# Patient Record
(demographics unavailable — no encounter records)

---
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
## 2019-01-17 NOTE — EKG
Test Reason : 

Blood Pressure : ***/*** mmHG

Vent. Rate : 068 BPM     Atrial Rate : 068 BPM

   P-R Int : 110 ms          QRS Dur : 092 ms

    QT Int : 368 ms       P-R-T Axes : 047 -21 029 degrees

   QTc Int : 391 ms

 

SINUS RHYTHM WITH SINUS ARRHYTHMIA WITH SHORT [place]

INCOMPLETE RIGHT BUNDLE BRANCH BLOCK

BORDERLINE ECG

NO PREVIOUS ECGS AVAILABLE

Confirmed by JOSE HERMOSILLO MD ([id number]) on 1/17/2019 2:35:47 PM

 

Referred By:             Confirmed By:JOSE HERMOSILLO MD

## 2019-01-17 NOTE — PDOC
History of Present Illness





- General


Chief Complaint: Seizure


Stated Complaint: SEIZURE


Time Seen by Provider: 01/17/19 08:24


History Source: Care Provider, EMS, Nursing Home Records


Exam Limitations: Clinical Condition





- History of Present Illness


Initial Comments: 





01/17/19 08:46


The patient is a 36M with a PMH of congenital diplegia, CP, MR who presents to 

the ER after having a seizure at his NH. Per EMS, he has a history of seizures 

and had a seizure around 0615 where his eyes rolled in the back of his head 

with facial twitching and eye twitching and drooling. He has not had seizures 

in over 2 years. He is nonverbal and cannot provide any history.





Past History





- Past Medical History


Allergies/Adverse Reactions: 


 Allergies











Allergy/AdvReac Type Severity Reaction Status Date / Time


 


No Known Allergies Allergy   Verified 01/17/19 08:37











Home Medications: 


Ambulatory Orders





Acetaminophen [Tylenol] 650 mg PO Q4H PRN 01/17/19 


Albuterol Sulfate Inhaler - [Ventolin Hfa Inhaler -] 1 - 2 inh PO Q4H 01/17/19 


Cetirizine HCl [Zyrtec -] 10 mg PO HS 01/17/19 


Clonazepam 0.5 mg PO TID 01/17/19 


Docusate Sodium [Colace] 100 mg PO BID 01/17/19 


Emollient Combination No.92 [Lubriderm Daily Moisture] 177 ml TP DAILY 01/17/19 


Lorazepam [Ativan] 1 mg PO DAILY 01/17/19 


Multivitamin [Poly-Vitamin] 1 each PO DAILY 01/17/19 


Olanzapine [Zyprexa] 7.5 mg PO DAILY 01/17/19 


Simethicone [Gas Relief] 40 mg PO TID 01/17/19 


Sodium Phosphate/Na Biphos [Fleet Adult Rectal Enema -] 133 ml RC ASDIR 01/17/ 19 


Valproic Acid [Depakene] 500 mg PO DAILY 01/17/19 








COPD: No


CHF: No


DVT: No (profound mental retardation,)


Psychiatric Problems: Yes (agiation, aggresive behavior.)


Seizures: Yes (epilepsy.)


Other medical history: left hip dislocated hip, cauliflower ear,bilateral 

blindness





- Immunization History


Td Vaccination: Yes


TDAP Vaccination: Yes


Immunization Up to Date: Yes





- Suicide/Smoking/Psychosocial Hx


Smoking Status: No (profound MR)


Smoking History: Never smoked


Years of Tobacco Use: 0


Have you smoked in the past 12 months: No


Number of Cigarettes Smoked Daily: 0


Cigars Per Day: 0


Information on smoking cessation initiated: No


Hx Alcohol Use: No


Drug/Substance Use Hx: No





**Review of Systems





- Review of Systems


Able to Perform ROS?: No (nonverbal)





*Physical Exam





- Vital Signs


 Last Vital Signs











Temp Pulse Resp BP Pulse Ox


 


 96.3 F L  67   16   137/97   100 


 


 01/17/19 08:19  01/17/19 08:19  01/17/19 08:19  01/17/19 08:19  01/17/19 08:19














- Physical Exam


Comments: 





01/17/19 09:00


GENERAL: Contracted, but well developed, well nourished.


HEENT: Normocephalic, atraumatic. Hearing grossly normal. Moist mucous 

membranes. 


NECK: Supple. No JVD. 


CARDIOVASCULAR: Regular rate and rhythm. No murmurs, rubs, or gallops. 


PULMONARY: No evidence of respiratory distress. Coarse breath sounds 

bilaterally.


ABDOMINAL: Soft. Non-tender. Non-distended. No rebound or guarding. 


MUSCULOSKELETAL: Diffusely contracted.


EXTREMITIES: No cyanosis. No clubbing. No edema. No calf tenderness or swelling.


SKIN: Warm and dry. Normal capillary refill. No rashes. No jaundice. 


PSYCHIATRIC: Cooperative. Good eye contact. Appropriate mood and affect.








Moderate Sedation





- Procedure Monitoring


Vital Signs: 


Procedure Monitoring Vital Signs











Temperature  96.3 F L  01/17/19 08:19


 


Pulse Rate  67   01/17/19 08:19


 


Respiratory Rate  16   01/17/19 08:19


 


Blood Pressure  137/97   01/17/19 08:19


 


O2 Sat by Pulse Oximetry (%)  100   01/17/19 08:19











ED Treatment Course





- LABORATORY


CBC & Chemistry Diagram: 


 01/17/19 08:58





 01/17/19 08:58





- RADIOLOGY


Radiology Studies Ordered: 














 Category Date Time Status


 


 CHEST X-RAY PORTABLE* [RAD] Stat Radiology  01/17/19 08:31 Ordered














Medical Decision Making





- Medical Decision Making





01/17/19 09:02


The patient is a 36M with MMP presenting from [facility] for possible seizure 

activity. Rectal temp of 96, concerning for sepsis. Will order septic workup 

and valproic acid level. Provider # [id number]. 





01/17/19 10:56


Lactic of 3.1. Will give fluid bolus and recheck as UA is negative and CXR is 

negative. Valproic acid given.





01/17/19 13:50


Repeat lactate 1.4. Will d/c with PCP fu.





*DC/Admit/Observation/Transfer


Diagnosis at time of Disposition: 


 Seizure








- Discharge Dispostion


Disposition: HOME


Condition at time of disposition: Stable


Decision to Admit order: No





- Referrals





- Patient Instructions


Printed Discharge Instructions:  DI for Seizure Disorder -- Adult


Additional Instructions: 


Please follow up with your primary care physician in 2-3 days. 





Please return to the ER if you have any signs or symptoms of chest pain, 

shortness of breath, uncontrollable fever, chills, nausea, vomiting, numbness, 

tingling, or weakness in any part of your body, changes in vision, or slurred 

speech.





Please return to the ER if symptoms persist, worsen, or new symptoms arise.








- Post Discharge Activity

## 2019-02-16 NOTE — CONSULT
Consult - text type





- Consultation


Consultation Note: 


            Neurology





History of Present Illness:


Patient is a 36M from [facility] with history of congenital diplegia, CP, MR

, seizures and presented ER with seizure today here today, day of admission. 

EMS reported that the patient had a seizure prior to their arrival, then they 

witnessed a seizure in the field. Both seizures lasted for about a minute. BGM 

fingerstick was 126. Patient was given 5 versed IV in the field, resolved 

symptoms. Patient takes benzodiazepines and valproate for seizures. No other 

history available. Seen at bedside in ER and no seizure like activity. Spoke to 

representative from [place] and reports last seizure approximately 1-2 mons 

ago. Discused with PCP and would benefit from increased dose of Depakene. 





Past History





- Past Medical History


Allergies/Adverse Reactions: 


 Allergies











Allergy/AdvReac Type Severity Reaction Status Date / Time


 


No Known Allergies Allergy   Verified 02/16/19 06:41











Home Medications: 


Ambulatory Orders





Cetirizine HCl [Zyrtec -] 10 mg PO HS 01/17/19 


Clonazepam 0.5 mg PO TID 01/17/19 


Emollient Combination No.92 [Lubriderm Daily Moisture] 177 ml TP DAILY 01/17/19 


Lorazepam [Ativan] 1 mg PO DAILY 01/17/19 


Multivitamin [Poly-Vitamin] 1 each PO DAILY 01/17/19 


Olanzapine [Zyprexa] 7.5 mg PO DAILY 01/17/19 


Simethicone [Gas Relief] 40 mg PO TID 01/17/19 


Valproic Acid [Depakene] 500 mg PO DAILY 01/17/19 








COPD: No


CHF: No


DVT: No (profound mental retardation,)


Psychiatric Problems: Yes (agiation, aggresive behavior.)


Seizures: Yes (epilepsy.)





- Immunization History


Td Vaccination: Yes


TDAP Vaccination: Yes


Immunization Up to Date: Yes





- Suicide/Smoking/Psychosocial Hx


Smoking Status: No (profound MR)


Smoking History: Never smoked


Years of Tobacco Use: 0


Have you smoked in the past 12 months: No


Number of Cigarettes Smoked Daily: 0


Cigars Per Day: 0


Hx Alcohol Use: No


Drug/Substance Use Hx: No





**Review of Systems





- Review of Systems


Able to Perform ROS?: No (2/2 clinical condition)





*Physical Exam





- Vital Signs


 Vital Signs











Temperature  96.7 F L  02/16/19 06:35


 


Pulse Rate  105 H  02/16/19 08:30


 


Respiratory Rate  24 H  02/16/19 08:30


 


Blood Pressure  119/84   02/16/19 08:30


 


O2 Sat by Pulse Oximetry (%)  98   02/16/19 08:30














- Physical Exam


GENERAL: Sonorous, no response to verbal stimuli


HEAD: No signs of trauma


EYES: No appreciable pupils or eye movement, sclera anicteric, conjunctiva clear


ENT: Auricles normal inspection, hearing grossly normal, nares patent, Moist 

mucosa


NECK: Contracted neck, no JVD


LUNGS: No distress, clear to auscultation bilaterally


HEART: Tachycardic, regular, normal S1 and S2, no murmurs, rubs or gallops, 

peripheral pulses normal and equal bilaterally.


ABDOMEN: Soft, nontender, normoactive bowel sounds.  No guarding, no rebound.  

No masses


EXTREMITIES: Contractures in all extremities


NEUROLOGICAL: Contracted, moving all extremities


SKIN: Warm, Dry, normal turgor, no rashes or lesions noted.





 


 CBCD











WBC  14.7 K/mm3 (4.0-10.0)  H  02/16/19  06:25    


 


RBC  5.39 M/mm3 (4.00-5.60)   02/16/19  06:25    


 


Hgb  15.8 GM/dL (11.7-16.9)   02/16/19  06:25    


 


Hct  46.5 % (35.4-49)   02/16/19  06:25    


 


MCV  86.1 fl (80-96)   02/16/19  06:25    


 


MCHC  34.1 g/dl (32.0-35.9)   02/16/19  06:25    


 


RDW  14.0 % (11.9-15.9)   02/16/19  06:25    


 


Plt Count  199 K/MM3 (134-434)   02/16/19  06:25    


 


MPV  9.1 fl (7.5-11.1)   02/16/19  06:25    








 CMP











Sodium  137 mmol/L (136-145)   02/16/19  06:25    


 


Potassium  3.5 mmol/L (3.5-5.1)   02/16/19  06:25    


 


Chloride  100 mmol/L ()   02/16/19  06:25    


 


Carbon Dioxide  27 mmol/L (21-32)   02/16/19  06:25    


 


Anion Gap  10 MMOL/L (8-16)   02/16/19  06:25    


 


BUN  13 mg/dL (7-18)   02/16/19  06:25    


 


Creatinine  1.0 mg/dL (0.55-1.3)   02/16/19  06:25    


 


Creat Clearance w eGFR  > 60  (>60)   02/16/19  06:25    


 


Random Glucose  149 mg/dL ()  H  02/16/19  06:25    


 


Calcium  9.0 mg/dL (8.5-10.1)   02/16/19  06:25    


 


Total Bilirubin  0.4 mg/dL (0.2-1)   02/16/19  06:25    


 


AST  43 U/L (15-37)  H  02/16/19  06:25    


 


ALT  59 U/L (13-61)   02/16/19  06:25    


 


Alkaline Phosphatase  93 U/L ()   02/16/19  06:25    


 


Total Protein  8.0 g/dl (6.4-8.2)   02/16/19  06:25    


 


Albumin  4.2 g/dl (3.4-5.0)   02/16/19  06:25    











Diagnostics:


Head CT - completed, reviewed





Plan/Assessment:


36M from [facility] with history of congenital diplegia, CP, MR, seizures 

and presented ER with seizure today here today, day of admission. EMS reported 

that the patient had a seizure prior to their arrival, then they witnessed a 

seizure in the field. Both seizures lasted for about a minute. BGM fingerstick 

was 126. Patient was given 5 versed IV in the field, resolved symptoms. Patient 

takes benzodiazepines and valproate for seizures. No other history available. 

Seen at bedside in ER and no seizure like activity. Spoke to representative 

from [place] and reports last seizure approximately 1-2 mons ago. Discused 

with PCP and would benefit from increased dose of Depakene, will adjust to 750. 

Monitor for infectious etiologies, hydration recommended. Seizure precations, 

medication compliance. .

## 2019-02-16 NOTE — HP
CHIEF COMPLAINT:


seizure activity in resident facility 





PCP:lauren 





HISTORY OF PRESENT ILLNESS:


Patient is a 36M from [facility] with history of congenital diplegia, CP, 

Mental retardation, seizures today He presented ER with seizure ,EMS reported 

that the patient had a seizure prior to their arrival, then they witnessed a 

seizure in the field. Both seizures lasted for about a minute. BGM fingerstick 

was 126. Patient was given 5 versed IV in the field, resolved symptoms. 


Later in er he received more ativan.Patient takes benzodiazepines and valproate 

for seizures. No other history available. Seen at bedside in ER and no seizure 

like activity. Spoke to representative from [facility] and reports last seizure 

approximately 1-2 mons ago. He was in er in January and has normal wbc count 








ER course was notable for:


(1)seizure   


(2)elevated wbc count


(3)seizure free since in ER 





Recent Travel:no 





PAST MEDICAL HISTORY:


seizure disorder ,


blindness


mental retardation


Diplegia congenital


wheelchair bound





PAST SURGICAL HISTORY:





Social History:


Smoking:


Alcohol:


Drugs: 





Family History:


Allergies





No Known Allergies Allergy (Verified 02/16/19 06:41)


 








HOME MEDICATIONS:


 Home Medications











 Medication  Instructions  Recorded


 


Cetirizine HCl [Zyrtec -] 10 mg PO HS 01/17/19


 


Clonazepam 0.5 mg PO TID 01/17/19


 


Emollient Combination No.92 177 ml TP DAILY 01/17/19





[Lubriderm Daily Moisture]  


 


Lorazepam [Ativan] 1 mg PO DAILY 01/17/19


 


Multivitamin [Poly-Vitamin] 1 each PO DAILY 01/17/19


 


Olanzapine [Zyprexa] 7.5 mg PO DAILY 01/17/19


 


Simethicone [Gas Relief] 40 mg PO TID 01/17/19


 


Valproic Acid [Depakene] 500 mg PO DAILY 01/17/19








REVIEW OF SYSTEMS


CONSTITUTIONAL: 


Absent:  fever, chills, 


HEENT: 


Absent:  rhinorrhea, nasal congestion, throat pain, throat swelling, difficulty 

swallowing, mouth swelling, ear pain, eye pain, visual changes


CARDIOVASCULAR: 


Absent: chest pain, syncope, palpitations, irregular heart rate, lightheadedness

, peripheral edema


RESPIRATORY: 


Absent: cough, shortness of breath, dyspnea with exertion, orthopnea, wheezing, 

stridor, hemoptysis


GASTROINTESTINAL:


Absent: abdominal pain, abdominal distension, nausea, vomiting, diarrhea, 

constipation, melena, hematochezia


GENITOURINARY: 


Absent: dysuria, frequency, urgency, hesitancy, hematuria, flank pain, genital 

pain


MUSCULOSKELETAL: 


Absent: myalgia, arthralgia, joint swelling, back pain, neck pain


SKIN: 


Absent: rash, itching, pallor


HEMATOLOGIC/IMMUNOLOGIC: 


Absent: easy bleeding, easy bruising, lymphadenopathy, frequent infections


ENDOCRINE:


Absent: unexplained weight gain, unexplained weight loss, heat intolerance, 

cold intolerance


NEUROLOGIC: 


seizures 


PSYCHIATRIC: 


on multiple psych meds 








PHYSICAL EXAMINATION


 Vital Signs - 24 hr











  02/16/19 02/16/19 02/16/19





  06:31 06:35 08:30


 


Temperature 96.7 F L 96.7 F L 


 


Pulse Rate  102 H 


 


Pulse Rate [   105 H





Apical]   


 


Respiratory  18 24 H





Rate   


 


Blood Pressure  129/93 


 


Blood Pressure   119/84





[Right Arm]   


 


O2 Sat by Pulse  98 98





Oximetry (%)   














  02/16/19





  15:05


 


Temperature 


 


Pulse Rate 


 


Pulse Rate [ 88





Apical] 


 


Respiratory 25 H





Rate 


 


Blood Pressure 


 


Blood Pressure 101/66





[Right Arm] 


 


O2 Sat by Pulse 98





Oximetry (%) 











GENERAL: Awake, 


HEAD: Normal with no signs of trauma.


EYES: blind eyes 


EARS, NOSE, THROAT: Ears normal, nares patent, oropharynx clear without 

exudates. Moist mucous membranes.


NECK: Normal range of motion, supple without lymphadenopathy, JVD, or masses.


LUNGS:  crackles both lungs 


HEART: Regular rate and rhythm, normal S1 and S2 without murmur, rub or gallop.


ABDOMEN: Soft, nontender, not distended, normoactive bowel sounds, no guarding, 

no rebound, no masses.  No hepatomegaly or  splenomegaly. 


MUSCULOSKELETAL:contractures in his legs 


UPPER EXTREMITIES: 2+ pulses, warm, well-perfused. No cyanosis. No clubbing. No 

peripheral edema.


LOWER EXTREMITIES: 2+ pulses, warm, well-perfused. No calf tenderness. No 

peripheral edema. 


NEUROLOGICAL:  lethargic but moving all extremities 


SKIN: Warm, dry, normal turgor, no rashes or lesions noted, normal capillary 

refill. 


 Laboratory Results - last 24 hr











  02/16/19 02/16/19 02/16/19





  06:25 06:25 06:25


 


WBC  14.7 H  


 


RBC  5.39  


 


Hgb  15.8  


 


Hct  46.5  


 


MCV  86.1  


 


MCH  29.3  


 


MCHC  34.1  


 


RDW  14.0  


 


Plt Count  199  


 


MPV  9.1  


 


Absolute Neuts (auto)  10.9 H  


 


Neutrophils %  74.0  


 


Lymphocytes %  16.3  


 


Monocytes %  9.1  


 


Eosinophils %  0.3  


 


Basophils %  0.3  


 


Nucleated RBC %  0  


 


ESR   


 


PT with INR   11.80 


 


INR   1.00 


 


PTT (Actin FS)   24.4 L 


 


Sodium    137


 


Potassium    3.5


 


Chloride    100


 


Carbon Dioxide    27


 


Anion Gap    10


 


BUN    13


 


Creatinine    1.0


 


Creat Clearance w eGFR    > 60


 


Random Glucose    149 H


 


Lactic Acid   


 


Calcium    9.0


 


Total Bilirubin    0.4


 


AST    43 H


 


ALT    59


 


Alkaline Phosphatase    93


 


Troponin I    < 0.02


 


Total Protein    8.0


 


Albumin    4.2


 


Urine Color   


 


Urine Appearance   


 


Urine pH   


 


Ur Specific Gravity   


 


Urine Protein   


 


Urine Glucose (UA)   


 


Urine Ketones   


 


Urine Blood   


 


Urine Nitrite   


 


Urine Bilirubin   


 


Urine Urobilinogen   


 


Ur Leukocyte Esterase   


 


Urine WBC (Auto)   


 


Urine RBC (Auto)   


 


Ur Epithelial Cells   


 


Hyaline Casts   


 


Urine Mucus   


 


Valproic Acid   














  02/16/19 02/16/19 02/16/19





  06:25 06:32 06:34


 


WBC   


 


RBC   


 


Hgb   


 


Hct   


 


MCV   


 


MCH   


 


MCHC   


 


RDW   


 


Plt Count   


 


MPV   


 


Absolute Neuts (auto)   


 


Neutrophils %   


 


Lymphocytes %   


 


Monocytes %   


 


Eosinophils %   


 


Basophils %   


 


Nucleated RBC %   


 


ESR    2


 


PT with INR   


 


INR   


 


PTT (Actin FS)   


 


Sodium   


 


Potassium   


 


Chloride   


 


Carbon Dioxide   


 


Anion Gap   


 


BUN   


 


Creatinine   


 


Creat Clearance w eGFR   


 


Random Glucose   


 


Lactic Acid  1.8  


 


Calcium   


 


Total Bilirubin   


 


AST   


 


ALT   


 


Alkaline Phosphatase   


 


Troponin I   


 


Total Protein   


 


Albumin   


 


Urine Color   


 


Urine Appearance   


 


Urine pH   


 


Ur Specific Gravity   


 


Urine Protein   


 


Urine Glucose (UA)   


 


Urine Ketones   


 


Urine Blood   


 


Urine Nitrite   


 


Urine Bilirubin   


 


Urine Urobilinogen   


 


Ur Leukocyte Esterase   


 


Urine WBC (Auto)   


 


Urine RBC (Auto)   


 


Ur Epithelial Cells   


 


Hyaline Casts   


 


Urine Mucus   


 


Valproic Acid   15.6 L 














  02/16/19





  08:38


 


WBC 


 


RBC 


 


Hgb 


 


Hct 


 


MCV 


 


MCH 


 


MCHC 


 


RDW 


 


Plt Count 


 


MPV 


 


Absolute Neuts (auto) 


 


Neutrophils % 


 


Lymphocytes % 


 


Monocytes % 


 


Eosinophils % 


 


Basophils % 


 


Nucleated RBC % 


 


ESR 


 


PT with INR 


 


INR 


 


PTT (Actin FS) 


 


Sodium 


 


Potassium 


 


Chloride 


 


Carbon Dioxide 


 


Anion Gap 


 


BUN 


 


Creatinine 


 


Creat Clearance w eGFR 


 


Random Glucose 


 


Lactic Acid 


 


Calcium 


 


Total Bilirubin 


 


AST 


 


ALT 


 


Alkaline Phosphatase 


 


Troponin I 


 


Total Protein 


 


Albumin 


 


Urine Color  Yellow


 


Urine Appearance  Clear


 


Urine pH  6.0


 


Ur Specific Gravity  1.021


 


Urine Protein  1+ H


 


Urine Glucose (UA)  Negative


 


Urine Ketones  Negative


 


Urine Blood  Negative


 


Urine Nitrite  Negative


 


Urine Bilirubin  Negative


 


Urine Urobilinogen  Negative


 


Ur Leukocyte Esterase  Negative


 


Urine WBC (Auto)  3


 


Urine RBC (Auto)  2


 


Ur Epithelial Cells  Rare


 


Hyaline Casts  3


 


Urine Mucus  Rare


 


Valproic Acid 











ASSESSMENT/PLAN:


Patient is a 36M from [facility] with history of congenital diplegia, CP, MR

, seizures and presented ER with seizure today here today with elevated wbc 

count and pneumonia on cxr 





1 uncontrolled seizure 


 He recieved loading dose of keppra and seen by neuro and recomended that to 

inc valporic acid to 750 bid


ct head is no acute stroke





2 dehydration


 Start fluids iv d5/half 


3 pneumonia


 His x rays shows possible pneumonia with wbc count elevated


 will start on iv rocephin and zithromax


 will repeat labs am


  


4 mental retardation 


   mary continue his usual care medications 





 Current Medications











Generic Name Dose Route Start Last Admin





  Trade Name Freq  PRN Reason Stop Dose Admin


 


Ceftriaxone Sodium 1 gm/  100 mls @ 200 mls/hr  02/16/19 15:30  





  Dextrose  IVPB   





  DAILY KATHI   





     





     





  Protocol   





     


 


Azithromycin 500 mg/ Dextrose  250 mls @ 250 mls/hr  02/16/19 15:30  





  IVPB   





  DAILY KATHI   





     





     





     





     


 


Dextrose/Sodium Chloride  1,000 mls @ 83 mls/hr  02/16/19 15:30  





  D5-1/2ns -  IV   





  ASDIR KATHI   





     





     





     





     


 


Lorazepam  1 mg  02/17/19 10:00  





  Ativan -  PO   





  DAILY KATHI   





     





     





     





     


 


Non-Formulary Medication  10 mg  02/16/19 22:00  





  Cetirizine Hcl  PO   





  HS KATHI   





     





     





     





     


 


Non-Formulary Medication  0.5 mg  02/16/19 22:00  





  Clonazepam [Clonazepam]  PO   





  TID KATHI   





     





     





     





     


 


Non-Formulary Medication  177 ml  02/17/19 10:00  





  Emollient Combination No.92 [Lubriderm Daily Moisture]  TP   





  DAILY KATHI   





     





     





     





     


 


Non-Formulary Medication  1 each  02/17/19 10:00  





  Multivitamin [Poly-Vitamin]  PO   





  DAILY KATHI   





     





     





     





     


 


Olanzapine  7.5 mg  02/16/19 15:30  





  Zyprexa -  PO   





  DAILY KATHI   





     





     





     





     


 


Simethicone  40 mg  02/16/19 22:00  





  Mylicon Liquid -  PO   





  TID KATHI   





     





     





     





     


 


Valproate Sodium  750 mg  02/16/19 22:00  





  Depakene -  PO   





  BID KATHI   





     





     





     





     














Visit type





- Emergency Visit


Emergency Visit: Yes


ED Registration Date: 02/16/19


Care time: The patient presented to the Emergency Department on the above date 

and was hospitalized for further evaluation of their emergent condition.





- New Patient


This patient is new to me today: Yes


Date on this admission: 02/16/19





- Critical Care


Critical Care patient: No

## 2019-02-16 NOTE — PDOC
*Physical Exam





- Vital Signs


 Last Vital Signs











Temp Pulse Resp BP Pulse Ox


 


 96.7 F L  102 H  18   129/93   98 


 


 02/16/19 06:35  02/16/19 06:35  02/16/19 06:35  02/16/19 06:35  02/16/19 06:35














ED Treatment Course





- LABORATORY


CBC & Chemistry Diagram: 


 02/16/19 06:25





 02/16/19 06:25





- ADDITIONAL ORDERS


Additional order review: 


 Laboratory  Results











  02/16/19





  06:32


 


Valproic Acid  15.6 L








 











  02/16/19





  06:25


 


RBC  5.39


 


MCV  86.1


 


MCHC  34.1


 


RDW  14.0


 


MPV  9.1


 


Neutrophils %  74.0


 


Lymphocytes %  16.3


 


Monocytes %  9.1


 


Eosinophils %  0.3


 


Basophils %  0.3














- Medications


Given in the ED: 


ED Medications














Discontinued Medications














Generic Name Dose Route Start Last Admin





  Trade Name Freq  PRN Reason Stop Dose Admin


 


Lorazepam  2 mg  02/16/19 06:16  02/16/19 06:24





  Ativan Injection -  IVPUSH  02/16/19 06:17  2 mg





  ONCE ONE   Administration





     





     





     





     














Medical Decision Making





- Medical Decision Making





02/16/19 07:00 Received sign out from resident Dr. Dozier. In short, pt is a 36 y/

o male presenting from [facility] for multiple seizures in setting of known 

seizure disorder. Managed with valproic acid with benzo abortive therapy. Found 

to be hypothermic and tachycardic on arrival. Will follow up on pending labs. 

Anticipate admission. 





Pt's Valproic Acid was found to be subtheraputic. Suspect this is the reason 

for the seizures. Pt has already received a loading dose of Keppra. Will hold 

Valproic Acid and defer medication adjustment to medicine team.





02/16/19 08:55 Telephone page sent for Dr. Johns through answering service. 

Awaiting call back. 





02/16/19 08:59 Telephone consultation with Dr. Johns. Verbally appraised of the 

pts HPI, ED course, and current plan of management. Requested neurology 

consult with Dr. Boles. 





02/16/19 09:08 Telephone page alerting to consult sent to physician covering 

for Dr. Boles. 





Pt was erroneously admitted to Dr. Johns's service. When alerted, pt was re-

admitted to Dr. Martini, [facility]. 














*DC/Admit/Observation/Transfer


Diagnosis at time of Disposition: 


 Seizure, Abnormal level of other drugs, medicaments and biological substances 

in specimens from other organs, systems and tissues








- Discharge Dispostion


Condition at time of disposition: Stable


Decision to Admit order: Yes





- Referrals





- Patient Instructions





- Post Discharge Activity

## 2019-02-16 NOTE — PDOC
Attending Attestation





- Resident


Resident Name: AgustínmarialuisaMalcolm





- ED Attending Attestation


I have performed the following: I have examined & evaluated the patient, The 

case was reviewed & discussed with the resident, I agree w/resident's findings 

& plan, Exceptions are as noted





- HPI


HPI: 





02/16/19 07:24


36M CP, MR, seizure d/o on VPA and keppra, presenting to ED in status 

epilepticus. Per EMS, pt had two witnessed seizures prior to arrival. He was 

given 5 mg versed IV in the field. However, in ED, pt had another witnessed GTC 

seizure. Pt was given ativen 2mg IV with subsequent resolution.


Pt is nonverbal at baseline, cannot contribute any history. No NH report 

available at this time.





- Physicial Exam


PE: 





02/16/19 07:43


Agree with resident exam





- Critical Care Time


Total Critical Care Time: 60


Critical Care Statement: The care of this patient involved high complexity 

decision making to prevent further life threatening deterioration of the patient

's condition and/or to evaluate & treat vital organ system(s) failure or risk 

of failure.





- Medical Decision Making





02/16/19 07:43


36 M with CP, MR, presenting in status epilepticus. Seizures now controlled s/p 

benzos. Will load with Keppra as well. Pt afebrile here but will evaluate for 

infectious process. Also obtain head CT to r/o intracranial process.


- Labs, cultures


- CXR, UA


- CT head


- Keppra load


- Benzos PRN

## 2019-02-16 NOTE — EKG
Test Reason : 

Blood Pressure : ***/*** mmHG

Vent. Rate : 102 BPM     Atrial Rate : 102 BPM

   P-R Int : 116 ms          QRS Dur : 080 ms

    QT Int : 338 ms       P-R-T Axes : 055 -32 052 degrees

   QTc Int : 440 ms

 

SINUS TACHYCARDIA

LEFT AXIS DEVIATION

NONSPECIFIC ST AND T WAVE ABNORMALITY

ABNORMAL ECG

WHEN COMPARED WITH ECG OF 17-JAN-2019 10:25,

VENT. RATE HAS INCREASED BY  34 BPM

Confirmed by SHELLY SHAH MD ([id number]) on 2/16/2019 12:55:00 PM

 

Referred By:             Confirmed By:SHELLY SHAH MD

## 2019-02-16 NOTE — PDOC
History of Present Illness





- General


Stated Complaint: SEIZURE


Time Seen by Provider: 02/16/19 06:14


History Source: EMS, Nursing Home Records


Exam Limitations: Clinical Condition





- History of Present Illness


Initial Comments: 





02/16/19 06:40


Patient is a 36M from [place] with history of congenital diplegia, CP, MR, 

seizures here today with seizure. EMS reports that the patient had a seizure 

prior to their arrival, then they witnessed a seizure in the field. Both 

seizures lasted for about a minute. Fingerstick was 126. Given 5 versed IV in 

the field, resolved symptoms. Patient takes benzos and valproate for seizures. 

No other history available.





Past History





- Past Medical History


Allergies/Adverse Reactions: 


 Allergies











Allergy/AdvReac Type Severity Reaction Status Date / Time


 


No Known Allergies Allergy   Verified 02/16/19 06:41











Home Medications: 


Ambulatory Orders





Cetirizine HCl [Zyrtec -] 10 mg PO HS 01/17/19 


Clonazepam 0.5 mg PO TID 01/17/19 


Emollient Combination No.92 [Lubriderm Daily Moisture] 177 ml TP DAILY 01/17/19 


Lorazepam [Ativan] 1 mg PO DAILY 01/17/19 


Multivitamin [Poly-Vitamin] 1 each PO DAILY 01/17/19 


Olanzapine [Zyprexa] 7.5 mg PO DAILY 01/17/19 


Simethicone [Gas Relief] 40 mg PO TID 01/17/19 


Valproic Acid [Depakene] 500 mg PO DAILY 01/17/19 








COPD: No


CHF: No


DVT: No (profound mental retardation,)


Psychiatric Problems: Yes (agiation, aggresive behavior.)


Seizures: Yes (epilepsy.)





- Immunization History


Td Vaccination: Yes


TDAP Vaccination: Yes


Immunization Up to Date: Yes





- Suicide/Smoking/Psychosocial Hx


Smoking Status: No (profound MR)


Smoking History: Never smoked


Years of Tobacco Use: 0


Have you smoked in the past 12 months: No


Number of Cigarettes Smoked Daily: 0


Cigars Per Day: 0


Hx Alcohol Use: No


Drug/Substance Use Hx: No





**Review of Systems





- Review of Systems


Able to Perform ROS?: No (2/2 clinical condition)





*Physical Exam





- Vital Signs


 Last Vital Signs











Temp Pulse Resp BP Pulse Ox


 


 96.7 F L            


 


 02/16/19 06:31            














- Physical Exam


Comments: 





02/16/19 06:42


GENERAL: Sonorous, no response to verbal stimuli


HEAD: No signs of trauma


EYES: No appreciable pupils or eye movement, sclera anicteric, conjunctiva clear


ENT: Auricles normal inspection, hearing grossly normal, nares patent, Moist 

mucosa


NECK: Contracted neck, no JVD


LUNGS: No distress, clear to auscultation bilaterally


HEART: Tachycardic, regular, normal S1 and S2, no murmurs, rubs or gallops, 

peripheral pulses normal and equal bilaterally.


ABDOMEN: Soft, nontender, normoactive bowel sounds.  No guarding, no rebound.  

No masses


EXTREMITIES: Contractures in all extremities


NEUROLOGICAL: Contracted, moving all extremities


SKIN: Warm, Dry, normal turgor, no rashes or lesions noted.








Moderate Sedation





- Procedure Monitoring


Vital Signs: 


Procedure Monitoring Vital Signs











Temperature  96.7 F L  02/16/19 06:31


 


Pulse Rate      


 


Respiratory Rate      


 


Blood Pressure      


 


O2 Sat by Pulse Oximetry (%)      











ED Treatment Course





- LABORATORY


CBC & Chemistry Diagram: 


 02/16/19 06:25





 02/16/19 06:25





- RADIOLOGY


Radiology Studies Ordered: 














 Category Date Time Status


 


 HEAD CT WITHOUT CONTRAST [CT] Stat CT Scan  02/16/19 06:15 Ordered


 


 CHEST X-RAY PORTABLE* [RAD] Stat Radiology  02/16/19 06:14 Ordered














- Medications


Given in the ED: 


ED Medications














Discontinued Medications














Generic Name Dose Route Start Last Admin





  Trade Name Freq  PRN Reason Stop Dose Admin


 


Lorazepam  2 mg  02/16/19 06:16  02/16/19 06:24





  Ativan Injection -  IVPUSH  02/16/19 06:17  2 mg





  ONCE ONE   Administration





     





     





     





     














Medical Decision Making





- Medical Decision Making





02/16/19 06:44


Patient is 36M with history of Patient is a 36M from [place] with history of 

congenital diplegia, CP, MR, seizures here today with seizure. While being 

triaged, patient had third seizure. Given 2 ativan, which broke seizure 

activity. Difficult to assess if patient returned to baseline given baseline 

mental status. BP stable, airway being protected, tachycardic, temp 96.7. Broad 

workup initiated, fluids started. Will evaluate for infectious cause. Patient 

to be signed out to day team.





*DC/Admit/Observation/Transfer


Diagnosis at time of Disposition: 


 Seizure








- Discharge Dispostion


Condition at time of disposition: Stable





- Referrals





- Patient Instructions





- Post Discharge Activity

## 2019-02-17 NOTE — PN
Progress Note (short form)





- Note


Progress Note: 





               Neurology





History of Present Illness:


Patient is a 36M from [facility] with history of congenital diplegia, CP, MR

, seizures and presented ER with multiple seizures on day of admission. EMS 

reported that the patient had a seizure prior to their arrival, then they 

witnessed a seizure in the field. Both seizures lasted for about a minute. BGM 

fingerstick was 126. Patient was given 5 versed IV in the field, resolved 

symptoms. Patient takes benzodiazepines and valproate for seizures. No other 

history available. Seen at bedside in ER and no seizure like activity. Spoke to 

representative from [place] and reports last seizure approximately 1-2 mons 

ago. Discussed with PCP and would benefit from increased dose of Depakene. 

Repeat Head CT completed on 2/17/19, no acute changes noted. No seizures 

overnight, discussed with HHA at bedside of adjustment of depekene so staff can 

be aware. Neurologically remains stable this AM.





Allergies/Adverse Reactions: 


 Allergies











Allergy/AdvReac Type Severity Reaction Status Date / Time


 


No Known Allergies Allergy   Verified 02/16/19 06:41








Active Medications





Azithromycin (Zithromax 500mg Ivpb (Pre-Docked))  500 mg IVPB DAILY KATHI


   Last Admin: 02/17/19 10:44 Dose:  500 mg


Clonazepam (Klonopin -)  0.5 mg PO Q8H PRN


   PRN Reason: SEIZURES


Dextrose/Sodium Chloride (D5-1/2ns -)  1,000 mls @ 83 mls/hr IV ASDIR KATHI


   Last Admin: 02/16/19 17:15 Dose:  83 mls/hr


Piperacillin Sod/Tazobactam (Sod 3.375 gm/ Dextrose)  50 mls @ 100 mls/hr IVPB 

Q8H-IV KATHI; Protocol


Loratadine (Claritin -)  10 mg PO HS KATHI


   Last Admin: 02/16/19 22:21 Dose:  10 mg


Lorazepam (Ativan -)  1 mg PO DAILY KATHI


   Last Admin: 02/17/19 09:14 Dose:  Not Given


Multi-Ingredient Lotion (Eucerin (Small Jar) -)  1 applic TP DAILY KATHI


   Last Admin: 02/17/19 10:43 Dose:  1 applic


Multivitamins/Minerals/Vitamin C (Tab-A-Vit -)  1 tab PO DAILY KATHI


   Last Admin: 02/17/19 09:15 Dose:  Not Given


Olanzapine 5 mg/ Olanzapine 2. (5 mg)  7.5 mg PO DAILY [facility]


   Last Admin: 02/17/19 09:15 Dose:  Not Given


Simethicone (Mylicon -)  80 mg PO TID [facility]


   Last Admin: 02/17/19 06:19 Dose:  Not Given


Valproate Sodium (Depakene -)  750 mg PO BID [facility]


   Last Admin: 02/17/19 09:15 Dose:  Not Given








*Physical Exam





- Vital Signs


 Vital Signs











Temperature  98.3 F   02/17/19 06:00


 


Pulse Rate  110 H  02/17/19 06:00


 


Respiratory Rate  18   02/17/19 06:00


 


Blood Pressure  110/63   02/17/19 06:00


 


O2 Sat by Pulse Oximetry (%)  97   02/16/19 21:00














- Physical Exam


GENERAL: Sonorous, no response to verbal stimuli


HEAD: No signs of trauma


EYES: No appreciable pupils or eye movement, sclera anicteric, conjunctiva clear


ENT: Auricles normal inspection, hearing grossly normal, nares patent, Moist 

mucosa


NECK: Contracted neck, no JVD


LUNGS: No distress, clear to auscultation bilaterally


HEART: Tachycardic, regular, normal S1 and S2, no murmurs, rubs or gallops, 

peripheral pulses normal and equal bilaterally.


ABDOMEN: Soft, nontender, normoactive bowel sounds.  No guarding, no rebound.  

No masses


EXTREMITIES: Contractures in all extremities


NEUROLOGICAL: Contracted, moving all extremities


SKIN: Warm, Dry, normal turgor, no rashes or lesions noted.





 CBCD











WBC  14.7 K/mm3 (4.0-10.0)  H  02/16/19  06:25    


 


RBC  5.39 M/mm3 (4.00-5.60)   02/16/19  06:25    


 


Hgb  15.8 GM/dL (11.7-16.9)   02/16/19  06:25    


 


Hct  46.5 % (35.4-49)   02/16/19  06:25    


 


MCV  86.1 fl (80-96)   02/16/19  06:25    


 


MCHC  34.1 g/dl (32.0-35.9)   02/16/19  06:25    


 


RDW  14.0 % (11.9-15.9)   02/16/19  06:25    


 


Plt Count  199 K/MM3 (134-434)   02/16/19  06:25    


 


MPV  9.1 fl (7.5-11.1)   02/16/19  06:25    








 CMP











Sodium  137 mmol/L (136-145)   02/16/19  06:25    


 


Potassium  3.5 mmol/L (3.5-5.1)   02/16/19  06:25    


 


Chloride  100 mmol/L ()   02/16/19  06:25    


 


Carbon Dioxide  27 mmol/L (21-32)   02/16/19  06:25    


 


Anion Gap  10 MMOL/L (8-16)   02/16/19  06:25    


 


BUN  13 mg/dL (7-18)   02/16/19  06:25    


 


Creatinine  1.0 mg/dL (0.55-1.3)   02/16/19  06:25    


 


Creat Clearance w eGFR  > 60  (>60)   02/16/19  06:25    


 


Random Glucose  149 mg/dL ()  H  02/16/19  06:25    


 


Calcium  9.0 mg/dL (8.5-10.1)   02/16/19  06:25    


 


Total Bilirubin  0.4 mg/dL (0.2-1)   02/16/19  06:25    


 


AST  43 U/L (15-37)  H  02/16/19  06:25    


 


ALT  59 U/L (13-61)   02/16/19  06:25    


 


Alkaline Phosphatase  93 U/L ()   02/16/19  06:25    


 


Total Protein  8.0 g/dl (6.4-8.2)   02/16/19  06:25    


 


Albumin  4.2 g/dl (3.4-5.0)   02/16/19  06:25    








 CARDIAC ENZYMES











Troponin I  < 0.02 ng/ml (0.00-0.05)   02/16/19  06:25    











Diagnostics:


Head CT - completed, reviewed


Head CT (repeat) - completed





Plan/Assessment:


36M from [facility] with history of congenital diplegia, CP, MR, seizures 

and presented ER with multiple seizures on day of admission. EMS reported that 

the patient had a seizure prior to their arrival, then they witnessed a seizure 

in the field. Both seizures lasted for about a minute. BGM fingerstick was 126. 

Patient was given 5 versed IV in the field, resolved symptoms. Patient takes 

benzodiazepines and valproate for seizures. No other history available. Seen at 

bedside in ER and no seizure like activity. Spoke to representative from 

[place] and reports last seizure approximately 1-2 mons ago. Discussed with 

PCP and would benefit from increased dose of Depakene, adjusted to 750. Monitor 

for infectious etiologies, hydration recommended. Seizure precations, 

medication compliance. Repeat head ct completed on 2/17/19, no changes noted. 

Remains on Zosyn, continue infectious mgmt as this can lower seizure threshold.

   Neurologically stable at this time.

## 2019-02-17 NOTE — PN
Physical Exam: 


SUBJECTIVE: Patient seen and examined


he is more sleepy today but no distress


he hasnt received any more benzo since the ambulance dose 


pt is not eating well 








OBJECTIVE:





 Vital Signs











 Period  Temp  Pulse  Resp  BP Sys/Zhao  Pulse Ox


 


 Last 24 Hr  98.3 F-98.7 F    17-22  105-118/58-79  95-97











GENERAL: The patient is drowsy 


EYES: PERRL, extraocular movements intact, sclera anicteric, conjunctiva clear. 

No ptosis. 


ENT: Ears normal, nares patent, oropharynx clear without exudates, moist mucous 


membranes.


NECK: Trachea midline, full range of motion, supple. 


LUNGS: Breath sounds equal, clear to auscultation bilaterally, no wheezes, no 

crackles, no 


accessory muscle use. 


HEART: Regular rate and rhythm, S1, S2 without murmur, rub or gallop.


ABDOMEN: Soft, nontender, nondistended, normoactive bowel sounds, no guarding, 

no 


rebound, no hepatosplenomegaly, no masses.


EXTREMITIES: 2+ pulses, warm, well-perfused, no edema. 


NEUROLOGICAL:  drowsy and lethargic 











 Laboratory Results - last 24 hr











  02/17/19 02/17/19





  11:40 11:40


 


WBC  6.3 


 


RBC  4.32 


 


Hgb  13.0 


 


Hct  37.5  D 


 


MCV  86.9 


 


MCH  30.0 


 


MCHC  34.5 


 


RDW  14.0 


 


Plt Count  156  D 


 


MPV  9.0 


 


Absolute Neuts (auto)  3.7 


 


Neutrophils %  59.2 


 


Lymphocytes %  28.2  D 


 


Monocytes %  11.8 H 


 


Eosinophils %  0.3 


 


Basophils %  0.5 


 


Nucleated RBC %  0 


 


Sodium   136


 


Potassium   3.5


 


Chloride   100


 


Carbon Dioxide   27


 


Anion Gap   9


 


BUN   11


 


Creatinine   0.8


 


Creat Clearance w eGFR   > 60


 


Random Glucose   188 H


 


Calcium   8.3 L








Active Medications











Generic Name Dose Route Start Last Admin





  Trade Name Freq  PRN Reason Stop Dose Admin


 


Azithromycin  500 mg  02/16/19 17:00  02/17/19 10:44





  Zithromax 500mg Ivpb (Pre-Docked)  IVPB   500 mg





  DAILY KATHI   Administration





     





     





     





     


 


Clonazepam  0.5 mg  02/16/19 22:00  





  Klonopin -  PO   





  Q8H PRN   





  SEIZURES   





     





     





     


 


Dextrose/Sodium Chloride  1,000 mls @ 83 mls/hr  02/16/19 17:00  02/16/19 17:15





  D5-1/2ns -  IV   83 mls/hr





  ASDIR KATHI   Administration





     





     





     





     


 


Piperacillin Sod/Tazobactam  50 mls @ 100 mls/hr  02/16/19 19:00  





  Sod 3.375 gm/ Dextrose  IVPB   





  Q8H-IV KATHI   





     





     





  Protocol   





     


 


Loratadine  10 mg  02/16/19 22:00  02/16/19 22:21





  Claritin -  PO   10 mg





  HS KATHI   Administration





     





     





     





     


 


Lorazepam  1 mg  02/17/19 10:00  02/17/19 09:14





  Ativan -  PO   Not Given





  DAILY KATHI   





     





     





     





     


 


Multi-Ingredient Lotion  1 applic  02/17/19 10:00  02/17/19 10:43





  Eucerin (Small Jar) -  TP   1 applic





  DAILY KATHI   Administration





     





     





     





     


 


Multivitamins/Minerals/Vitamin C  1 tab  02/17/19 10:00  02/17/19 09:15





  Tab-A-Vit -  PO   Not Given





  DAILY KATHI   





     





     





     





     


 


Olanzapine 5 mg/ Olanzapine 2.  7.5 mg  02/16/19 17:00  02/17/19 09:15





  5 mg  PO   Not Given





  DAILY KATHI   





     





     





     





     


 


Simethicone  80 mg  02/16/19 22:00  02/17/19 13:03





  Mylicon -  PO   Not Given





  TID KATHI   





     





     





     





     


 


Valproate Sodium  750 mg  02/17/19 22:00  





  Depacon Injection -  IV   





  BID KATHI   





     





     





     





     


 


Valproate Sodium  750 mg  02/17/19 15:15  





  Depacon Injection -  IVPB  02/17/19 15:16  





  ONCE ONE   





     





     





     





     











ASSESSMENT/PLAN:


sepsis due to aspiration pneumonia


his wbc count is better continue the zosyn


will call id for consult due need for approval of abx





Altered mental status 


 repeat ct head is no change on stat order


see by neuro and will watch and change his valporic to iv 





hold his benzos due to change his mental status 





Visit type





- Emergency Visit


Emergency Visit: Yes


ED Registration Date: 02/16/19


Care time: The patient presented to the Emergency Department on the above date 

and was hospitalized for further evaluation of their emergent condition.





- New Patient


This patient is new to me today: No





- Critical Care


Critical Care patient: No





- Discharge Referral


Referred to [facility] No

## 2019-02-18 NOTE — PN
Physical Exam: 


SUBJECTIVE: Patient seen and examined, per aid at bedside, He has taken his 

food and has not been in any distress








OBJECTIVE:





 Vital Signs











 Period  Temp  Pulse  Resp  BP Sys/Zhao  Pulse Ox


 


 Last 24 Hr  98.0 F-98.8 F  60-91  18-22  129-138/63-72  95-95











GENERAL: The patient is awake, not alert is at baseline MS per aid at bedside.


HEAD: Normal with no signs of trauma.


EYES: PERRL, extraocular movements intact, sclera anicteric, conjunctiva clear. 

No ptosis. 


ENT: Ears normal, nares patent, oropharynx clear without exudates, moist mucous 


membranes.


NECK: Trachea midline, full range of motion, supple. 


LUNGS: Breath sounds equal, clear to auscultation bilaterally, no wheezes, no 

crackles, no 


accessory muscle use. 


HEART: Regular rate and rhythm, S1, S2 without murmur, rub or gallop.


ABDOMEN: Soft, nontender, nondistended, normoactive bowel sounds, no guarding, 

no 


rebound, no hepatosplenomegaly, no masses.


EXTREMITIES: 2+ pulses, warm, well-perfused, no edema. 


NEUROLOGICAL: contracted,at his baseline MS, No new focal neurologic findings


PSYCH: Normal mood, normal affect.


SKIN: Warm, dry, normal turgor, no rashes or lesions noted














 Laboratory Results - last 24 hr











  02/18/19 02/18/19





  13:35 13:35


 


WBC  4.1 


 


RBC  4.86 


 


Hgb  14.4 


 


Hct  42.3 


 


MCV  87.0 


 


MCH  29.7 


 


MCHC  34.2 


 


RDW  13.6 


 


Plt Count  167 


 


MPV  9.0 


 


Absolute Neuts (auto)  1.6 


 


Neutrophils %  39.2 L D 


 


Lymphocytes %  47.3 H D 


 


Monocytes %  12.8 H 


 


Eosinophils %  0.4 


 


Basophils %  0.3 


 


Nucleated RBC %  0 


 


Sodium   138


 


Potassium   4.1


 


Chloride   102


 


Carbon Dioxide   27


 


Anion Gap   8


 


BUN   6 L


 


Creatinine   0.8


 


Creat Clearance w eGFR   > 60


 


Random Glucose   104


 


Calcium   9.1








Active Medications











Generic Name Dose Route Start Last Admin





  Trade Name Freq  PRN Reason Stop Dose Admin


 


Clonazepam  0.5 mg  02/16/19 22:00  





  Klonopin -  PO   





  Q8H PRN   





  SEIZURES   





     





     





     


 


Dextrose/Sodium Chloride  1,000 mls @ 83 mls/hr  02/16/19 17:00  02/17/19 14:00





  D5-1/2ns -  IV   83 mls/hr





  ASDIR KATHI   Administration





     





     





     





     


 


Piperacillin Sod/Tazobactam  50 mls @ 100 mls/hr  02/17/19 20:30  02/18/19 09:31





  Sod 3.375 gm/ Dextrose  IVPB   100 mls/hr





  Q8H-IV KATHI   Administration





     





     





  Protocol   





     


 


Loratadine  10 mg  02/16/19 22:00  02/17/19 22:13





  Claritin -  PO   10 mg





  HS KATHI   Administration





     





     





     





     


 


Lorazepam  1 mg  02/17/19 10:00  02/18/19 10:49





  Ativan -  PO   Not Given





  DAILY KATHI   





     





     





     





     


 


Multi-Ingredient Lotion  1 applic  02/17/19 10:00  02/18/19 10:48





  Eucerin (Small Jar) -  TP   1 applic





  DAILY KATHI   Administration





     





     





     





     


 


Multivitamins/Minerals/Vitamin C  1 tab  02/17/19 10:00  02/18/19 10:49





  Tab-A-Vit -  PO   Not Given





  DAILY KATHI   





     





     





     





     


 


Olanzapine 5 mg/ Olanzapine 2.  7.5 mg  02/16/19 17:00  02/18/19 10:49





  5 mg  PO   Not Given





  DAILY KATHI   





     





     





     





     


 


Simethicone  80 mg  02/16/19 22:00  02/18/19 14:12





  Mylicon -  PO   Not Given





  TID KATHI   





     





     





     





     


 


Valproate Sodium  750 mg  02/17/19 22:00  02/18/19 10:00





  Depacon Injection -  IV   750 mg





  BID KATHI   Administration





     





     





     





     











ASSESSMENT/PLAN:


37 Y/O M W advanced developmental, known seizure disorder, BIBE with 2 episodes 

of seizure, was found to have subtheraputic valproic acid level. also was 

treated for possible aspiration PNA. 








Seizure in the setting of subtheraputic levels: evaluated by neuro and 

valproate increased to 750 mg BID, No more seizure while in hospital


MS has improved and is now at his baseline











Infectious W/U: has been treated for possible aspiration PNA, has been afebrile 

while in the hospital. Per ID recs will change antibiotics to Augmentine and 

plan for 7 day total antibiotics 


No UTI at this time.








Will C/W rest of  home medication


 Dispo: home tomorrow








Visit type





- Emergency Visit


Emergency Visit: Yes


ED Registration Date: 02/16/19


Care time: The patient presented to the Emergency Department on the above date 

and was hospitalized for further evaluation of their emergent condition.





- New Patient


This patient is new to me today: No





- Critical Care


Critical Care patient: No





- Discharge Referral


Referred to [facility] No

## 2019-02-18 NOTE — PN
Progress Note (short form)





- Note


Progress Note: 





ID consult dictated





imp/reccd





35 yo man from [facility] with profound developmental disabilities, seizure 

disorder admitted with multiple seizures  2/16


valproaic acid level was low





cxray with left basilar infiltrate





no fevers


now normal wbc





has completed 48 hours of antibiotics iv


can switch to po augmentin for another 5 days once he is taking po 


d/c zithromax





ua is negative, doubt UTI

















Problem List





- Problems


(1) Seizure


Code(s): R56.9 - UNSPECIFIED CONVULSIONS   





(2) Pneumonia


Code(s): J18.9 - PNEUMONIA, UNSPECIFIED ORGANISM   


Qualifiers: 


   Laterality: right   Lung location: lower lobe of lung

## 2019-02-19 NOTE — PN
Progress Note (short form)





- Note


Progress Note: 





               Neurology





History of Present Illness:


Patient is a 36M from [facility] with history of congenital diplegia, CP, MR

, seizures and presented ER with multiple seizures on day of admission. EMS 

reported that the patient had a seizure prior to their arrival, then they 

witnessed a seizure in the field. Both seizures lasted for about a minute. BGM 

fingerstick was 126. Patient was given 5 versed IV in the field, resolved 

symptoms. Patient takes benzodiazepines and valproate for seizures. No other 

history available. Seen at bedside in ER and no seizure like activity. Spoke to 

representative from [place] and reports last seizure approximately 1-2 mons 

ago. Discussed with PCP and would benefit from increased dose of Depakene. 

Repeat Head CT completed on 2/17/19, no acute changes noted. No seizures 

overnight, discussed with HHA at bedside of adjustment of depekene again. 

Neurologically remains stable this AM. Patient with limited PO intake thus 

depakote being given IV. Getting ongoing medical optimization. 





Allergies/Adverse Reactions: 


 Allergies











Allergy/AdvReac Type Severity Reaction Status Date / Time


 


No Known Allergies Allergy   Verified 02/16/19 06:41








Active Medications





Clonazepam (Klonopin -)  0.5 mg PO Q8H PRN


   PRN Reason: SEIZURES


   Last Admin: 02/18/19 22:28 Dose:  0.5 mg


Dextrose/Sodium Chloride (D5-1/2ns -)  1,000 mls @ 83 mls/hr IV ASDIR KATHI


   Last Admin: 02/18/19 17:29 Dose:  83 mls/hr


Piperacillin Sod/Tazobactam (Sod 3.375 gm/ Dextrose)  50 mls @ 100 mls/hr IVPB 

Q8H-IV KATHI; Protocol


   Last Admin: 02/19/19 02:50 Dose:  100 mls/hr


Loratadine (Claritin -)  10 mg PO HS KATHI


   Last Admin: 02/18/19 22:28 Dose:  10 mg


Lorazepam (Ativan -)  1 mg PO DAILY KATHI


   Last Admin: 02/18/19 10:49 Dose:  Not Given


Multi-Ingredient Lotion (Eucerin (Small Jar) -)  1 applic TP DAILY KATHI


   Last Admin: 02/18/19 10:48 Dose:  1 applic


Multivitamins/Minerals/Vitamin C (Tab-A-Vit -)  1 tab PO DAILY KATHI


   Last Admin: 02/18/19 10:49 Dose:  Not Given


Olanzapine 5 mg/ Olanzapine 2. (5 mg)  7.5 mg PO DAILY [facility]


   Last Admin: 02/18/19 10:49 Dose:  Not Given


Simethicone (Mylicon -)  80 mg PO TID [facility]


   Last Admin: 02/19/19 06:35 Dose:  Not Given


Valproate Sodium (Depacon Injection -)  750 mg IV BID [facility]


   Last Admin: 02/18/19 22:35 Dose:  750 mg











*Physical Exam





- Vital Signs


  Vital Signs











 Period  Temp  Pulse  Resp  BP Sys/Zhao  Pulse Ox


 


 Last 24 Hr  98.0 F-99.1 F    20-22  127-148/63-83  97

















- Physical Exam


GENERAL: Sonorous, no response to verbal stimuli


HEAD: No signs of trauma


EYES: No appreciable pupils or eye movement, sclera anicteric, conjunctiva clear


ENT: Auricles normal inspection, hearing grossly normal, nares patent, Moist 

mucosa


NECK: Contracted neck, no JVD


LUNGS: No distress, clear to auscultation bilaterally


HEART: Tachycardic, regular, normal S1 and S2, no murmurs, rubs or gallops, 

peripheral pulses normal and equal bilaterally.


ABDOMEN: Soft, nontender, normoactive bowel sounds.  No guarding, no rebound.  

No masses


EXTREMITIES: Contractures in all extremities


NEUROLOGICAL: Contracted, moving all extremities


SKIN: Warm, Dry, normal turgor, no rashes or lesions noted.





  CBCD











WBC  4.1 K/mm3 (4.0-10.0)   02/18/19  13:35    


 


RBC  4.86 M/mm3 (4.00-5.60)   02/18/19  13:35    


 


Hgb  14.4 GM/dL (11.7-16.9)   02/18/19  13:35    


 


Hct  42.3 % (35.4-49)   02/18/19  13:35    


 


MCV  87.0 fl (80-96)   02/18/19  13:35    


 


MCHC  34.2 g/dl (32.0-35.9)   02/18/19  13:35    


 


RDW  13.6 % (11.9-15.9)   02/18/19  13:35    


 


Plt Count  167 K/MM3 (134-434)   02/18/19  13:35    


 


MPV  9.0 fl (7.5-11.1)   02/18/19  13:35    








 CMP











Sodium  138 mmol/L (136-145)   02/18/19  13:35    


 


Potassium  4.1 mmol/L (3.5-5.1)   02/18/19  13:35    


 


Chloride  102 mmol/L ()   02/18/19  13:35    


 


Carbon Dioxide  27 mmol/L (21-32)   02/18/19  13:35    


 


Anion Gap  8 MMOL/L (8-16)   02/18/19  13:35    


 


BUN  6 mg/dL (7-18)  L  02/18/19  13:35    


 


Creatinine  0.8 mg/dL (0.55-1.3)   02/18/19  13:35    


 


Creat Clearance w eGFR  > 60  (>60)   02/18/19  13:35    


 


Random Glucose  104 mg/dL ()   02/18/19  13:35    


 


Calcium  9.1 mg/dL (8.5-10.1)   02/18/19  13:35    


 


Total Bilirubin  0.4 mg/dL (0.2-1)   02/16/19  06:25    


 


AST  43 U/L (15-37)  H  02/16/19  06:25    


 


ALT  59 U/L (13-61)   02/16/19  06:25    


 


Alkaline Phosphatase  93 U/L ()   02/16/19  06:25    


 


Total Protein  8.0 g/dl (6.4-8.2)   02/16/19  06:25    


 


Albumin  4.2 g/dl (3.4-5.0)   02/16/19  06:25    








 CARDIAC ENZYMES











Troponin I  < 0.02 ng/ml (0.00-0.05)   02/16/19  06:25    














Diagnostics:


Head CT - completed, reviewed


Head CT (repeat) - completed





Plan/Assessment:


36M from [facility] with history of congenital diplegia, CP, MR, seizures 

and presented ER with multiple seizures on day of admission. EMS reported that 

the patient had a seizure prior to their arrival, then they witnessed a seizure 

in the field. Both seizures lasted for about a minute. BGM fingerstick was 126. 

Patient was given 5 versed IV in the field, resolved symptoms. Patient takes 

benzodiazepines and valproate for seizures. No other history available. Seen at 

bedside in ER and no seizure like activity. Spoke to representative from 

[place] and reports last seizure approximately 1-2 mons ago. Discussed with 

PCP and would benefit from increased dose of Depakene, adjusted to 750. Monitor 

for infectious etiologies, hydration recommended. Seizure precations, 

medication compliance. Repeat head ct completed on 2/17/19, no changes noted. 

Remains on Zosyn, continue infectious mgmt as this can lower seizure threshold.

   Neurologically stable at this time. Limited PO intake, getting Depakote via 

IV. No seizure events.

## 2019-02-19 NOTE — DS
Physical Exam: 


SUBJECTIVE: Patient seen and examined at the bedside.  





Aide at the bedside. in no acute distress. Tolerating PO. I fed patient pudding 

and he was able to swallow well. 


for discharge home.





OBJECTIVE:





 Vital Signs











 Period  Temp  Pulse  Resp  BP Sys/Zhao  Pulse Ox


 


 Last 24 Hr  98.0 F-99.1 F    20-22  127-148/68-83  97-97








PHYSICAL EXAM


GENERAL: The patient is awake, not alert is at baseline MS per aid at bedside.


HEAD: Normal with no signs of trauma.


EYES: PERRL, extraocular movements intact, sclera anicteric, conjunctiva clear. 

No ptosis. 


ENT: Ears normal, nares patent, oropharynx clear without exudates, moist mucous 

membranes.


NECK: Trachea midline, full range of motion, supple. 


LUNGS: Breath sounds equal, clear to auscultation bilaterally, no wheezes, no 

crackles, no accessory muscle use. 


HEART: Regular rate and rhythm, S1, S2 without murmur, rub or gallop.


ABDOMEN: Soft, nontender, nondistended, normoactive bowel sounds, no guarding, 

no 


rebound, no hepatosplenomegaly, no masses.


EXTREMITIES: 2+ pulses, warm, well-perfused, no edema. 


NEUROLOGICAL: contracted,at his baseline MS


PSYCH: Normal mood, normal affect.


SKIN: Warm, dry, normal turgor, no rashes or lesions noted


LABS


 Laboratory Results - last 24 hr











  02/18/19 02/18/19





  13:35 13:35


 


WBC  4.1 


 


RBC  4.86 


 


Hgb  14.4 


 


Hct  42.3 


 


MCV  87.0 


 


MCH  29.7 


 


MCHC  34.2 


 


RDW  13.6 


 


Plt Count  167 


 


MPV  9.0 


 


Absolute Neuts (auto)  1.6 


 


Neutrophils %  39.2 L D 


 


Lymphocytes %  47.3 H D 


 


Monocytes %  12.8 H 


 


Eosinophils %  0.4 


 


Basophils %  0.3 


 


Nucleated RBC %  0 


 


Sodium   138


 


Potassium   4.1


 


Chloride   102


 


Carbon Dioxide   27


 


Anion Gap   8


 


BUN   6 L


 


Creatinine   0.8


 


Creat Clearance w eGFR   > 60


 


Random Glucose   104


 


Calcium   9.1











HOSPITAL COURSE:





Date of Admission:02/16/19





Date of Discharge: 02/19/19





PRE HOSPITAL COURSE:





Patient is a 36M from [facility] with history of congenital diplegia, CP, 

Mental retardation.  Presented with seizures to the ER.  EMS reported that the 

patient had a seizure prior to their arrival, then they witnessed a seizure in 

the field. Both seizures lasted for about a minute. BGM fingerstick was 126. 

Patient was given 5 versed IV in the field which resolved symptoms. 





HOSPITAL COURSE BY PROBLEM LIST:





Seizure disorder:


Noted to have subtherapeutic valporic acid levels on admission.  Depakene 

increased to Depakene 750mg bid.  continue all other home seizure medications.  

Followed by neurology during hospitalization and has been cleared for 

discharge. 





Possible Aspiration pneumonia:


Treated with IV Zosyn.  Will transition to Augmentin PO for 5 more days.


He has been afebrile while in the hospital.  





DISCHARGE:


Discharge back to [facility].  Accepted back to [facility] by Dr. Jonathan Abdi.  

All medications called in to patient's home pharmacy. 





full code





Minutes to complete discharge: 60





Discharge Summary


Reason For Visit: ABD LEVEL OF OTHER DRUG,MEDICAMENT,OR BIOLOGICAL


Current Active Problems





Abnormal level of other drugs, medicaments and biological substances in 

specimens from other organs, systems and tissues (Acute)


Pneumonia (Acute)


Seizure (Acute)








Condition: Improved





- Instructions


Diet, Activity, Other Instructions: 


Mr. Layton/Joaquin Facility:





Mr. Juan C Layton was admitted for seizures and was found to have subtherapeutic 

valporic acid levels.  He was also treated for possible aspiration pneumonia





Here are our recommendations:





Seizures


No further seizures during hospitalization and has been seen by neurologist who 

recommends Depakene 750mg TWICE per day.   We recommend that he follows up with 

Dr. Randall, neurologist within 1-2 weeks after discharge.  Continue all your 

other seizure medications.





Aspiration pneumonia


We have treated with IV antibiotics for 3 days.  We will continue Augmentin 

500mg/125mg for 5 more days, twice daily at 8am and 8pm.  


He has been afebrile during the hospitalization.  





Please maintain aspiration precautions:  


- sit up to at least 45degress with all meals, preferably should be eating out 

of bed


- thicken liquids to nectar thickened consistency


- do not feed patient 2 hours before laying down to bed





Thank you for allowing us to care for Mr. Juan C Layton.














Referrals: 


Matty Ennis Jr [Non Staff, Medical] - 


Mert Randall MD [Staff Physician] - 1 Week


Disposition: SKILLED NURSING FACILITY





- Home Medications


Comprehensive Discharge Medication List: 


Ambulatory Orders





Cetirizine HCl [Zyrtec -] 10 mg PO HS 01/17/19 


Clonazepam 0.5 mg PO TID 01/17/19 


Emollient Combination No.92 [Lubriderm Daily Moisture] 177 ml TP DAILY 01/17/19 


Lorazepam [Ativan] 1 mg PO DAILY 01/17/19 


Multivitamin [Poly-Vitamin] 1 each PO DAILY 01/17/19 


Olanzapine [Zyprexa] 7.5 mg PO DAILY 01/17/19 


Simethicone [Gas Relief] 40 mg PO TID 01/17/19 


Amox-Tr/K Cl [Augmentin 500-125mg Tablet -] 1 tab PO BID@0800,1730 #10 tablet 02 /19/19 


Valproic Acid [Depakene] 750 mg PO BID #90 capsule 02/19/19 








This patient is new to me today: Yes


Date on this admission: 02/19/19


Emergency Visit: No


Critical Care patient: No





- Discharge Referral


Referred to [facility] No